# Patient Record
Sex: FEMALE | Race: WHITE | NOT HISPANIC OR LATINO | Employment: OTHER | URBAN - METROPOLITAN AREA
[De-identification: names, ages, dates, MRNs, and addresses within clinical notes are randomized per-mention and may not be internally consistent; named-entity substitution may affect disease eponyms.]

---

## 2018-03-16 NOTE — PATIENT DISCUSSION
This visual field clearly demonstrated a minimum of 46% loss of upper field of vision OU, with upper lid skin in repose and elevated by taping of the lid to demonstrate potential correction. This field shows that taping the lids significantly improved this patient's superior field of vision by approximately 46%, OU.

## 2019-09-11 ENCOUNTER — ESTABLISHED COMPREHENSIVE EXAM (OUTPATIENT)
Dept: URBAN - METROPOLITAN AREA CLINIC 35 | Facility: CLINIC | Age: 69
End: 2019-09-11

## 2019-09-11 DIAGNOSIS — H25.13: ICD-10-CM

## 2019-09-11 PROCEDURE — 92015 DETERMINE REFRACTIVE STATE: CPT

## 2019-09-11 PROCEDURE — 92014 COMPRE OPH EXAM EST PT 1/>: CPT

## 2019-09-11 ASSESSMENT — TONOMETRY
OS_IOP_MMHG: 14
OD_IOP_MMHG: 14

## 2019-09-11 ASSESSMENT — VISUAL ACUITY
OS_SC: J2
OD_SC: J3
OD_CC: 20/30-2
OS_CC: 20/25

## 2019-09-11 ASSESSMENT — KERATOMETRY
OS_K2POWER_DIOPTERS: 44.25
OS_K1POWER_DIOPTERS: 44.25
OD_K1POWER_DIOPTERS: 44.25
OD_K2POWER_DIOPTERS: 43.25

## 2021-03-12 ENCOUNTER — CATARACT CONSULT (OUTPATIENT)
Dept: URBAN - METROPOLITAN AREA CLINIC 39 | Facility: CLINIC | Age: 71
End: 2021-03-12

## 2021-03-12 DIAGNOSIS — H25.811: ICD-10-CM

## 2021-03-12 DIAGNOSIS — H25.812: ICD-10-CM

## 2021-03-12 PROCEDURE — 92025-1 CORNEAL TOPOGRAPHY, INS

## 2021-03-12 PROCEDURE — 92136TC INTERFEROMETRY - TECHNICAL COMPONENT

## 2021-03-12 PROCEDURE — 92015 DETERMINE REFRACTIVE STATE: CPT

## 2021-03-12 PROCEDURE — 92014 COMPRE OPH EXAM EST PT 1/>: CPT

## 2021-03-12 RX ORDER — MOXIFLOXACIN HYDROCHLORIDE 5 MG/ML: 1 SOLUTION/ DROPS OPHTHALMIC

## 2021-03-12 RX ORDER — NEPAFENAC 3 MG/ML: 1 SUSPENSION/ DROPS OPHTHALMIC ONCE A DAY

## 2021-03-12 RX ORDER — DUREZOL 0.5 MG/ML: 1 EMULSION OPHTHALMIC TWICE A DAY

## 2021-03-12 ASSESSMENT — VISUAL ACUITY
OD_SC: J4
OD_SC: 20/50+1
OS_SC: 20/80
OS_SC: J3
OS_BAT: 20/60
OD_BAT: 20/70

## 2021-03-12 ASSESSMENT — KERATOMETRY
OD_K2POWER_DIOPTERS: 43.25
OS_K1POWER_DIOPTERS: 44.25
OS_K2POWER_DIOPTERS: 44.25
OD_K1POWER_DIOPTERS: 44.25

## 2021-03-12 ASSESSMENT — TONOMETRY
OD_IOP_MMHG: 18
OS_IOP_MMHG: 18

## 2021-03-22 ASSESSMENT — KERATOMETRY
OD_K1POWER_DIOPTERS: 44.25
OS_K1POWER_DIOPTERS: 44.25
OD_K2POWER_DIOPTERS: 43.25
OS_K2POWER_DIOPTERS: 44.25

## 2021-03-23 ENCOUNTER — H&P (OUTPATIENT)
Dept: URBAN - METROPOLITAN AREA CLINIC 39 | Facility: CLINIC | Age: 71
End: 2021-03-23

## 2021-03-23 ENCOUNTER — SURGERY/PROCEDURE (OUTPATIENT)
Dept: URBAN - METROPOLITAN AREA CLINIC 39 | Facility: CLINIC | Age: 71
End: 2021-03-23

## 2021-03-23 DIAGNOSIS — H25.811: ICD-10-CM

## 2021-03-23 DIAGNOSIS — H25.812: ICD-10-CM

## 2021-03-23 PROCEDURE — 99211T TECH SERVICE

## 2021-03-23 PROCEDURE — 66999LNSR LENSAR LASER FOR CAT SX

## 2021-03-23 PROCEDURE — 66984CV REMOVE CATARACT, INSERT LENS, CUSTOM VISION

## 2021-03-23 ASSESSMENT — KERATOMETRY
OS_K2POWER_DIOPTERS: 44.25
OD_K2POWER_DIOPTERS: 43.25
OD_K1POWER_DIOPTERS: 44.25
OD_K2POWER_DIOPTERS: 43.25
OD_K1POWER_DIOPTERS: 44.25
OS_K1POWER_DIOPTERS: 44.25
OS_K1POWER_DIOPTERS: 44.25
OS_K2POWER_DIOPTERS: 44.25

## 2021-03-24 ENCOUNTER — CATARACT POST-OP 1-DAY (OUTPATIENT)
Dept: URBAN - METROPOLITAN AREA CLINIC 35 | Facility: CLINIC | Age: 71
End: 2021-03-24

## 2021-03-24 DIAGNOSIS — H25.811: ICD-10-CM

## 2021-03-24 DIAGNOSIS — H25.812: ICD-10-CM

## 2021-03-24 DIAGNOSIS — Z96.1: ICD-10-CM

## 2021-03-24 PROCEDURE — 99024 POSTOP FOLLOW-UP VISIT: CPT

## 2021-03-24 ASSESSMENT — VISUAL ACUITY
OS_SC: J3
OD_SC: J7
OD_SC: 20/30-1
OS_SC: 20/80

## 2021-03-24 ASSESSMENT — KERATOMETRY
OS_K1POWER_DIOPTERS: 44.25
OD_K2POWER_DIOPTERS: 43.25
OD_K1POWER_DIOPTERS: 44.25
OS_K2POWER_DIOPTERS: 44.25

## 2021-03-24 ASSESSMENT — TONOMETRY
OS_IOP_MMHG: 20
OD_IOP_MMHG: 20

## 2021-03-25 ENCOUNTER — SURGERY/PROCEDURE (OUTPATIENT)
Dept: URBAN - METROPOLITAN AREA CLINIC 39 | Facility: CLINIC | Age: 71
End: 2021-03-25

## 2021-03-25 ENCOUNTER — POST OP/EVAL OF SECOND EYE (OUTPATIENT)
Dept: URBAN - METROPOLITAN AREA CLINIC 39 | Facility: CLINIC | Age: 71
End: 2021-03-25

## 2021-03-25 DIAGNOSIS — H25.812: ICD-10-CM

## 2021-03-25 DIAGNOSIS — Z96.1: ICD-10-CM

## 2021-03-25 PROCEDURE — 66984CV REMOVE CATARACT, INSERT LENS, CUSTOM VISION

## 2021-03-25 PROCEDURE — 66999LNSR LENSAR LASER FOR CAT SX

## 2021-03-25 PROCEDURE — 92012 INTRM OPH EXAM EST PATIENT: CPT

## 2021-03-25 ASSESSMENT — VISUAL ACUITY
OS_SC: 20/80
OD_SC: 20/25+2
OS_BAT: 20/50

## 2021-03-25 ASSESSMENT — KERATOMETRY
OS_K2POWER_DIOPTERS: 44.25
OD_K1POWER_DIOPTERS: 44.25
OS_K1POWER_DIOPTERS: 44.25
OD_K2POWER_DIOPTERS: 43.25

## 2021-03-25 ASSESSMENT — TONOMETRY
OD_IOP_MMHG: 13
OS_IOP_MMHG: 14

## 2021-03-26 ENCOUNTER — 1 DAY POST-OP (OUTPATIENT)
Dept: URBAN - METROPOLITAN AREA CLINIC 39 | Facility: CLINIC | Age: 71
End: 2021-03-26

## 2021-03-26 DIAGNOSIS — Z96.1: ICD-10-CM

## 2021-03-26 PROCEDURE — 99024 POSTOP FOLLOW-UP VISIT: CPT

## 2021-03-26 ASSESSMENT — KERATOMETRY
OD_K2POWER_DIOPTERS: 43.25
OS_K2POWER_DIOPTERS: 44.25
OD_K1POWER_DIOPTERS: 44.25
OS_K1POWER_DIOPTERS: 44.25

## 2021-03-26 ASSESSMENT — VISUAL ACUITY: OS_SC: 20/40-2

## 2021-03-26 ASSESSMENT — TONOMETRY: OS_IOP_MMHG: 15

## 2021-04-22 ENCOUNTER — POST-OP CATARACT (OUTPATIENT)
Dept: URBAN - METROPOLITAN AREA CLINIC 35 | Facility: CLINIC | Age: 71
End: 2021-04-22

## 2021-04-22 DIAGNOSIS — Z96.1: ICD-10-CM

## 2021-04-22 DIAGNOSIS — H04.123: ICD-10-CM

## 2021-04-22 PROCEDURE — 99024 POSTOP FOLLOW-UP VISIT: CPT

## 2021-04-22 ASSESSMENT — VISUAL ACUITY
OS_SC: 20/20-1
OS_SC: J5
OU_SC: J3
OD_SC: J3
OU_CC: J1+
OU_SC: 20/20-1
OS_CC: J1+
OD_SC: 20/20-1
OD_CC: J1+

## 2021-10-27 ENCOUNTER — ESTABLISHED COMPREHENSIVE EXAM (OUTPATIENT)
Dept: URBAN - METROPOLITAN AREA CLINIC 35 | Facility: CLINIC | Age: 71
End: 2021-10-27

## 2021-10-27 DIAGNOSIS — H04.123: ICD-10-CM

## 2021-10-27 DIAGNOSIS — H52.13: ICD-10-CM

## 2021-10-27 PROCEDURE — 92015 DETERMINE REFRACTIVE STATE: CPT

## 2021-10-27 PROCEDURE — 92014 COMPRE OPH EXAM EST PT 1/>: CPT

## 2021-10-27 ASSESSMENT — TONOMETRY
OS_IOP_MMHG: 17
OD_IOP_MMHG: 17

## 2021-10-27 ASSESSMENT — KERATOMETRY
OD_K1POWER_DIOPTERS: 44.25
OS_K2POWER_DIOPTERS: 44.25
OS_K1POWER_DIOPTERS: 44.25
OD_K2POWER_DIOPTERS: 43.25

## 2021-10-27 ASSESSMENT — VISUAL ACUITY
OS_CC: J2
OD_CC: J1
OD_SC: 20/20-1
OU_CC: J1+
OU_SC: 20/20
OS_SC: 20/25

## 2024-02-28 ENCOUNTER — COMPREHENSIVE EXAM (OUTPATIENT)
Dept: URBAN - METROPOLITAN AREA CLINIC 35 | Facility: CLINIC | Age: 74
End: 2024-02-28

## 2024-02-28 DIAGNOSIS — H26.493: ICD-10-CM

## 2024-02-28 DIAGNOSIS — H04.123: ICD-10-CM

## 2024-02-28 PROCEDURE — 92014 COMPRE OPH EXAM EST PT 1/>: CPT

## 2024-02-28 ASSESSMENT — VISUAL ACUITY
OS_SC: 20/25
OD_SC: J10
OU_SC: 20/20
OD_SC: 20/20-1
OU_SC: J4
OS_SC: J10

## 2024-02-28 ASSESSMENT — TONOMETRY
OS_IOP_MMHG: 16
OD_IOP_MMHG: 18

## 2025-08-05 ENCOUNTER — COMPREHENSIVE EXAM (OUTPATIENT)
Age: 75
End: 2025-08-05

## 2025-08-05 DIAGNOSIS — H04.123: ICD-10-CM

## 2025-08-05 DIAGNOSIS — H01.024: ICD-10-CM

## 2025-08-05 DIAGNOSIS — H01.021: ICD-10-CM

## 2025-08-05 DIAGNOSIS — Z96.1: ICD-10-CM

## 2025-08-05 DIAGNOSIS — H26.493: ICD-10-CM

## 2025-08-05 PROCEDURE — 92014 COMPRE OPH EXAM EST PT 1/>: CPT
